# Patient Record
Sex: MALE | Race: BLACK OR AFRICAN AMERICAN
[De-identification: names, ages, dates, MRNs, and addresses within clinical notes are randomized per-mention and may not be internally consistent; named-entity substitution may affect disease eponyms.]

---

## 2019-03-25 NOTE — ED PHYSICIAN DOCUMENTATION
PD HPI UPPER EXT INJURY





- Stated complaint


Stated Complaint: RT HAND PAIN





- Chief complaint


Chief Complaint: Ext Problem





- History obtained from


History obtained from: Patient





- History of Present Illness


Location: Right (He accidentally hits the back of his right hand on a door frame

about 2 weeks ago and it got more swollen today.)





Review of Systems


Constitutional: denies: Fever, Chills


GI: denies: Abdominal Pain, Nausea, Vomiting


: reports: Reviewed and negative





PD PAST MEDICAL HISTORY





- Past Medical History


Past Medical History: No


Cardiovascular: None


Respiratory: None


Neuro: None


Endocrine/Autoimmune: None


GI: None


: None


HEENT: None


Psych: None


Musculoskeletal: None


Derm: None





- Past Surgical History


Past Surgical History: Yes


General: Appendectomy





- Allergies


Allergies/Adverse Reactions: 


                                    Allergies











Allergy/AdvReac Type Severity Reaction Status Date / Time


 


No Known Drug Allergies Allergy   Verified 03/25/19 21:00














- Social History


Does the pt smoke?: Yes


Smoking Status: Current every day smoker


Does the pt drink ETOH?: Yes


Does the pt have substance abuse?: No





- Immunizations


Immunizations are current?: Yes





- POLST


Patient has POLST: No





PD ED PE NORMAL





- Vitals


Vital signs reviewed: Yes





- General


General: Alert and oriented X 3, No acute distress





- Extremities


Extremities: Other (There is swelling of the dorsal third MCP with a fluidlike 

consistency.  There is no tenderness or limited range of motion.)





- Neuro


Neuro: Alert and oriented X 3, Normal speech





Results





- Vitals


Vitals: 





                               Vital Signs - 24 hr











  03/25/19





  20:56


 


Temperature 36.8 C


 


Heart Rate 64


 


Respiratory 17





Rate 


 


Blood Pressure 125/57 L


 


O2 Saturation 100








                                     Oxygen











O2 Source                      Room air

















- Rads (name of study)


  ** 3 views of the right hand


Radiology: EMP read contemporaneously (No fracture)





Departure





- Departure


Disposition: 01 Home, Self Care


Clinical Impression: 


Contusion of right hand


Qualifiers:


 Encounter type: initial encounter Qualified Code(s): S60.221A - Contusion of 

right hand, initial encounter





Condition: Good


Record reviewed to determine appropriate education?: Yes


Comments: 


Stop popping your knuckles.


If is not better in a week or so [place] 

[place] evaluation and treatment.

## 2019-03-25 NOTE — XRAY REPORT
Reason:  R 3RD KNUCKLE PAIN/SWELLING

Procedure Date:  03/25/2019   

Accession Number:  [id number] / [id number]                    

Procedure:  XR  - Hand 3 View RT CPT Code:  

 

FULL RESULT:

 

 

EXAM:

RIGHT HAND RADIOGRAPHY

 

EXAM DATE: 3/25/2019 09:30 PM.

 

CLINICAL HISTORY: R 3RD KNUCKLE PAIN/SWELLING.

 

COMPARISON: None.

 

TECHNIQUE: 3 views.

 

FINDINGS:

Bones: Normal. No fractures or bone lesions.

 

Joints: Normal. No subluxations.

 

Soft Tissues: Mild soft tissue swelling.

IMPRESSION: Soft tissue swelling.

 

[facility]

## 2020-09-30 ENCOUNTER — HOSPITAL ENCOUNTER (EMERGENCY)
Dept: HOSPITAL 76 - ED | Age: 20
Discharge: HOME | End: 2020-09-30
Payer: COMMERCIAL

## 2020-09-30 VITALS — SYSTOLIC BLOOD PRESSURE: 120 MMHG | DIASTOLIC BLOOD PRESSURE: 63 MMHG
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
## 2020-09-30 DIAGNOSIS — S62.633A: Primary | ICD-10-CM
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
## 2020-09-30 PROCEDURE — 99282 EMERGENCY DEPT VISIT SF MDM: CPT

## 2020-09-30 PROCEDURE — 73140 X-RAY EXAM OF FINGER(S): CPT

## 2020-09-30 PROCEDURE — 99283 EMERGENCY DEPT VISIT LOW MDM: CPT

## 2020-09-30 NOTE — XRAY REPORT
PROCEDURE:  Finger(s) LT

 

INDICATIONS:  jammed middle finger

 

TECHNIQUE:  AP hand, 2 views of the third finger(s) acquired.  

 

COMPARISON:  None

 

FINDINGS:  

 

Bones: Minimally displaced volar plate fracture of the distal phalanx of the third digit with articul
ar surface extension to the distal interphalangeal joint. No suspicious bony lesions.  

 

Soft tissues:  No suspicious soft tissue calcifications.  

 

IMPRESSION:  

Third digit fracture as above.

 

Reviewed by: Asya Dean MD on 9/30/2020 8:43 PM PDT

Approved by: Asya Dean MD on 9/30/2020 8:43 PM PDT

 

 

Station ID:  [id number]

## 2020-09-30 NOTE — ED PHYSICIAN DOCUMENTATION
History of Present Illness





- Stated complaint


Stated Complaint: FINGER PX, LEFT MIDDLE





- Chief complaint


Chief Complaint: Ext Problem





- History obtained from


History obtained from: Patient





- History of Present Illness


Timing: Prior to arrival





- Additonal information


Additional information: 


20-year-old male presents to the emergency department for acute left finger 

pain.  He reports that a number of times over the last week he has jammed his 

finger while playing football.  He reports that the football hits his finger 

when he attempts to catch it.  Most recent injury occurred 4 days ago.  He has 

swelling and pain at the PIP joint. Also reports that he fractured this finger 

when playing high school football about 5 years ago








Review of Systems


Constitutional: reports: Reviewed and negative


Eyes: reports: Reviewed and negative


Nose: reports: Reviewed and negative


Cardiac: reports: Reviewed and negative


Respiratory: reports: Reviewed and negative


Skin: reports: Reviewed and negative


Musculoskeletal: reports: Joint pain, Joint swelling (left middle finger)





PD PAST MEDICAL HISTORY





- Past Medical History


Past Medical History: No


Cardiovascular: None


Respiratory: None


Neuro: None


Endocrine/Autoimmune: None


GI: None


: None


HEENT: None


Psych: None


Musculoskeletal: None


Derm: None





- Past Surgical History


Past Surgical History: Yes


General: Appendectomy





- Present Medications


Home Medications: 


                                Ambulatory Orders











 Medication  Instructions  Recorded  Confirmed


 


Ibuprofen [Motrin] 600 mg PO Q6H PRN #30 tab 09/30/20 














- Allergies


Allergies/Adverse Reactions: 


                                    Allergies











Allergy/AdvReac Type Severity Reaction Status Date / Time


 


No Known Drug Allergies Allergy   Verified 09/30/20 19:46














- Social History


Does the pt smoke?: No


Smoking Status: Never smoker


Does the pt drink ETOH?: Yes


Does the pt have substance abuse?: No





- Immunizations


Immunizations are current?: Yes





- POLST


Patient has POLST: No





PD ED PE NORMAL





- General


General: Alert and oriented X 3, No acute distress, Well developed/nourished





- Cardiac


Cardiac: RRR, No murmur





- Respiratory


Respiratory: Clear bilaterally





- Extremities


Extremities: No deformity.  No: No tenderness to palpate (Swelling without 

erythema at the PIP joint of left middle finger.  Full range of motion extension

 and flexion against resistance though painful.  No deformity.  2+ radial pulse 

left wrist)





Results





- Vitals


Vitals: 


                               Vital Signs - 24 hr











  09/30/20





  19:39


 


Temperature 36.7 C


 


Heart Rate 58 L


 


Respiratory 16





Rate 


 


Blood Pressure 118/57 L


 


O2 Saturation 99








                                     Oxygen











O2 Source                      Room air

















- Rads (name of study)


  ** left finger


Radiology: Final report received (Minimally displaced volar plate fracture of 

the distal phalanx of the third digit with articular surface extension of the 

DIP)





PD MEDICAL DECISION MAKING





- ED course


Complexity details: reviewed results, considered differential, d/w patient


ED course: 


-year-old male here with acute left finger pain after repeatedly jamming the 

left middle finger while playing football.  Though he has swelling at the PIP 

joint the x-ray does show that he is got a minimally displaced volar plate 

fracture of the DIP joint.  Patient was placed into a long finger splint and 

advised ibuprofen or Tylenol for analgesia.  Referral to orthopedics for follow-

up.








Departure





- Departure


Disposition: 01 Home, Self Care


Clinical Impression: 


Fracture, finger, distal phalanx


Qualifiers:


 Encounter type: initial encounter Finger: middle finger Fracture type: closed 

Fracture alignment: nondisplaced Laterality: left Qualified Code(s): S62.663A - 

Nondisplaced fracture of distal phalanx of left middle finger, initial encounter

 for closed fracture





Condition: Stable


Record reviewed to determine appropriate education?: Yes


Instructions:  ED Fx Finger Closed Ch


Follow-Up: 


Neo Rolle MD [Provider Admit Priv/Credential] - 


Prescriptions: 


Ibuprofen [Motrin] 600 mg PO Q6H PRN #30 tab


 PRN Reason: Pain


Comments: 


Nacho I hope that your finger feels better soon.  Unfortunately the x-ray shows 

a: " minimally displaced volar plate fracture of the distal fail next of the 

third digit."





These please wear your aluminum finger splint at all times when out of bed.  I 

would recommend that you avoid contact sports like football or basketball until 

this fracture fully heals.  I have also recommended follow-up for you to 

orthopedics.  I would like you to be seen by them within the next 4 weeks.  It 

is likely they will re-x-ray the finger to further evaluate healing.

## 2020-12-17 ENCOUNTER — HOSPITAL ENCOUNTER (EMERGENCY)
Dept: HOSPITAL 76 - ED | Age: 20
Discharge: HOME | End: 2020-12-17
Payer: COMMERCIAL

## 2020-12-17 VITALS — SYSTOLIC BLOOD PRESSURE: 134 MMHG | DIASTOLIC BLOOD PRESSURE: 75 MMHG

## 2020-12-17 DIAGNOSIS — Z20.828: ICD-10-CM

## 2020-12-17 DIAGNOSIS — B97.89: ICD-10-CM

## 2020-12-17 DIAGNOSIS — J02.8: Primary | ICD-10-CM

## 2020-12-17 PROCEDURE — 99282 EMERGENCY DEPT VISIT SF MDM: CPT

## 2020-12-17 PROCEDURE — 99283 EMERGENCY DEPT VISIT LOW MDM: CPT

## 2020-12-17 NOTE — ED PHYSICIAN DOCUMENTATION
History of Present Illness





- Stated complaint


Stated Complaint: DIZZY,SORE THROAT,





- Chief complaint


Chief Complaint: General





- History obtained from


History obtained from: Patient





- Additonal information


Additional information: 


20-year-old male sent to the emergency department by [place] [facility] to obtain 

COVID-19 screening.  For much of the last week he has had a sore throat somewhat

fatigued and dizzy.  He has no cough or fever.  No tonsillar exudate.  No loss 

of taste or smell.  No nausea vomiting diarrhea or dysuria.  He has had no 

fainting episodes.  Denies any headache.  He denies any COVID-19 contacts.  He 

denies any pertinent past medical history and is not taking any scheduled 

medications.








Review of Systems


Constitutional: reports: Fatigue.  denies: Fever, Chills, Myalgias


Eyes: denies: Loss of vision, Decreased vision, Photophobia


Ears: reports: Reviewed and negative


Nose: denies: Rhinorrhea / runny nose, Congestion, Foreign Body


Throat: reports: Sore throat.  denies: Oral lesions / sores, Swollen tonsils


Cardiac: denies: Chest pain / pressure, Palpitations


Respiratory: denies: Dyspnea, Cough


GI: reports: Reviewed and negative


: reports: Reviewed and negative


Skin: reports: Reviewed and negative


Musculoskeletal: reports: Reviewed and negative





PD PAST MEDICAL HISTORY





- Past Medical History


Cardiovascular: None


Respiratory: None


Neuro: None


Endocrine/Autoimmune: None


GI: None


: None


HEENT: None


Psych: None


Musculoskeletal: None


Derm: None





- Past Surgical History


Past Surgical History: Yes


General: Appendectomy





- Present Medications


Home Medications: 


                                Ambulatory Orders











 Medication  Instructions  Recorded  Confirmed


 


No Known Home Medications  12/17/20 12/17/20














- Allergies


Allergies/Adverse Reactions: 


                                    Allergies











Allergy/AdvReac Type Severity Reaction Status Date / Time


 


No Known Drug Allergies Allergy   Verified 12/17/20 12:13














- Social History


Does the pt smoke?: No


Smoking Status: Never smoker


Does the pt drink ETOH?: Yes


Does the pt have substance abuse?: No





- Immunizations


Immunizations are current?: Yes





- POLST


Patient has POLST: No





PD ED PE NORMAL





- General


General: Alert and oriented X 3, No acute distress





- HEENT


HEENT: PERRL





- Neck


Neck: Supple, no meningeal sign, No adenopathy, Other (Mild posterior oropharynx

erythema without exudate.  Uvula midline.  No soft palate asymmetry.)





- Cardiac


Cardiac: RRR, No murmur





- Respiratory


Respiratory: No respiratory distress, Clear bilaterally





- Abdomen


Abdomen: Normal bowel sounds, Soft, Non tender





- Back


Back: No CVA TTP





- Derm


Derm: Normal color, Warm and dry, No rash





- Extremities


Extremities: No deformity, No tenderness to palpate, Normal ROM s pain





- Neuro


Neuro: Alert and oriented X 3


Eye Opening: Spontaneous


Motor: Obeys Commands


Verbal: Oriented


GCS Score: 15





- Psych


Psych: Normal mood





Results





- Vitals


Vitals: 





                               Vital Signs - 24 hr











  12/17/20





  11:57


 


Temperature 36.5 C


 


Heart Rate 75


 


Respiratory 16





Rate 


 


Blood Pressure 137/65 H


 


O2 Saturation 97








                                     Oxygen











O2 Source                      Room air

















PD MEDICAL DECISION MAKING





- ED course


Complexity details: re-evaluated patient, considered differential


ED course: 


20-year-old male presents emergency department with about 1 week of fatigue sore

throat mild myalgias.  He has no fevers.  [place] [employer] requested that he come 

here for COVID-19 screening.  Patient appears remarkably well.  Does not meet 

[facility] criteria for strep testing or empiric treatment.  He has no hypoxia and 

an oral normal cardiopulmonary exam.  COVID-19 screening is pending.  He was 

advised to remain in quarantine until results known








Departure





- Departure


Disposition: 01 Home, Self Care


Clinical Impression: 


 Viral pharyngitis, Encounter for screening laboratory testing for COVID-19 

virus





Instructions:  ED Pharyngitis Viral


Comments: 


You have a Covid test pending.  You need to self quarantine until the result is 

done and negative.  Do not leave your house.  Do not get near anybody.  The 

results should be done in 48 to 72 hours.  We will call with a positive result, 

the fastest way to get a negative result for confirmation though is to go to the

hospital website at [email or web address], click on the my [facility] tab and

sign up for the patient portal.





If any friends or family get sick and would like to have a Covid test done, but 

do not have signs or symptoms that would necessitate being hospitalized, we 

encourage testing through our coronavirus swabbing station, call [phone] to

schedule an appointment.

## 2022-06-11 ENCOUNTER — HOSPITAL ENCOUNTER (OUTPATIENT)
Dept: HOSPITAL 76 - EMS | Age: 22
Discharge: ANOTHER HOSPITAL | End: 2022-06-11
Payer: COMMERCIAL

## 2022-06-11 DIAGNOSIS — S81.002A: ICD-10-CM

## 2022-06-11 DIAGNOSIS — S61.502A: ICD-10-CM
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
## 2022-06-11 DIAGNOSIS — S51.802A: Primary | ICD-10-CM
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
## 2022-06-11 DIAGNOSIS — S41.002A: ICD-10-CM
